# Patient Record
Sex: FEMALE | Race: WHITE | NOT HISPANIC OR LATINO | Employment: STUDENT | URBAN - METROPOLITAN AREA
[De-identification: names, ages, dates, MRNs, and addresses within clinical notes are randomized per-mention and may not be internally consistent; named-entity substitution may affect disease eponyms.]

---
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
## 2022-01-03 RX ORDER — DROSPIRENONE AND ETHINYL ESTRADIOL 0.03MG-3MG
1 KIT ORAL DAILY
Qty: 84 TABLET | Refills: 0 | Status: SHIPPED | OUTPATIENT
Start: 2022-01-03 | End: 2022-01-14 | Stop reason: SDUPTHER

## 2022-01-14 ENCOUNTER — ANNUAL EXAM (OUTPATIENT)
Dept: OBGYN CLINIC | Facility: CLINIC | Age: 22
End: 2022-01-14
Payer: COMMERCIAL

## 2022-01-14 VITALS
SYSTOLIC BLOOD PRESSURE: 118 MMHG | BODY MASS INDEX: 24.4 KG/M2 | HEIGHT: 68 IN | DIASTOLIC BLOOD PRESSURE: 70 MMHG | WEIGHT: 161 LBS

## 2022-01-14 DIAGNOSIS — N93.9 ABNORMAL UTERINE BLEEDING (AUB): ICD-10-CM

## 2022-01-14 DIAGNOSIS — Z01.419 ENCOUNTER FOR WELL WOMAN EXAM WITH ROUTINE GYNECOLOGICAL EXAM: Primary | ICD-10-CM

## 2022-01-14 PROCEDURE — 99395 PREV VISIT EST AGE 18-39: CPT | Performed by: OBSTETRICS & GYNECOLOGY

## 2022-01-14 RX ORDER — DROSPIRENONE AND ETHINYL ESTRADIOL 0.03MG-3MG
1 KIT ORAL DAILY
Qty: 84 TABLET | Refills: 3 | Status: SHIPPED | OUTPATIENT
Start: 2022-01-14 | End: 2022-02-01 | Stop reason: SDUPTHER

## 2022-01-14 NOTE — PROGRESS NOTES
ASSESSMENT & PLAN: Chris Garcia is a 25 y o  Christdemian Jean with normal gynecologic exam     1   Routine well woman exam done today  2   Pap smears will start at age 26 per the ASCCP guidelines  3   Gardasil is recommended for patients from 526 years of age  The patient has not had the Gardasil vaccine series  4  The patient is not sexually active  She accepted contraception and options have been discussed  5  The following were reviewed in today's visit: use and side effects of OCPs  6  Patient to return to office in 12 months for Yearly  All questions have been answered to her satisfaction  CC:  Annual Gynecologic Examination    HPI: Chris Garcia is a 25 y o  Christdemian Jean who presents for annual gynecologic examination  She has the following concerns:  Doing well on the Desire  Would like to continue  Has not had irregular bleeding  Health Maintenance:    She exercises 7 days per week  She wears her seatbelt routinely  She does not perform regular monthly self breast exams  She feels safe at home  Patients does follow a healhy diet  No past medical history on file  Past Surgical History:   Procedure Laterality Date    FL INJECTION LEFT SHOULDER (ARTHROGRAM)  2021       Past OB/Gyn History:   Patient's last menstrual period was 2022  Menstrual History:  OB History        0    Para   0    Term   0       0    AB   0    Living   0       SAB   0    IAB   0    Ectopic   0    Multiple   0    Live Births   0              Patient's last menstrual period was 2022  History of sexually transmitted infection No  Patient is not currently sexually active        Family History   Problem Relation Age of Onset    Hypertension Mother     Migraines Father     Colon cancer Maternal Grandmother        Social History:  Social History     Socioeconomic History    Marital status: Single     Spouse name: Not on file    Number of children: Not on file    Years of education: Not on file    Highest education level: Not on file   Occupational History    Not on file   Tobacco Use    Smoking status: Never Smoker    Smokeless tobacco: Never Used   Vaping Use    Vaping Use: Never used   Substance and Sexual Activity    Alcohol use: No    Drug use: No    Sexual activity: Never   Other Topics Concern    Not on file   Social History Narrative    Not on file     Social Determinants of Health     Financial Resource Strain: Not on file   Food Insecurity: Not on file   Transportation Needs: Not on file   Physical Activity: Not on file   Stress: Not on file   Social Connections: Not on file   Intimate Partner Violence: Not on file   Housing Stability: Not on file       Allergies   Allergen Reactions    Pertussis Vaccines      Had seizure hx   Told to not receive vaccine       Current Outpatient Medications:     drospirenone-ethinyl estradiol (DUNG) 3-0 03 MG per tablet, Take 1 tablet by mouth daily, Disp: 84 tablet, Rfl: 3    Review of Systems:  Review of Systems    Physical Exam:  /70 (BP Location: Left arm, Patient Position: Sitting, Cuff Size: Adult)   Ht 5' 8" (1 727 m)   Wt 73 kg (161 lb)   LMP 01/08/2022   BMI 24 48 kg/m²    OBGyn Exam  /70 (BP Location: Left arm, Patient Position: Sitting, Cuff Size: Adult)   Ht 5' 8" (1 727 m)   Wt 73 kg (161 lb)   LMP 01/08/2022   BMI 24 48 kg/m²   General appearance: alert and oriented, in no acute distress  Head: Normocephalic, without obvious abnormality, atraumatic  Lungs: clear to auscultation bilaterally  Heart: regular rate and rhythm, S1, S2 normal, no murmur, click, rub or gallop  Abdomen: soft, non-tender; bowel sounds normal; no masses,  no organomegaly

## 2022-02-01 DIAGNOSIS — N93.9 ABNORMAL UTERINE BLEEDING (AUB): ICD-10-CM

## 2022-02-01 RX ORDER — DROSPIRENONE AND ETHINYL ESTRADIOL 0.03MG-3MG
1 KIT ORAL DAILY
Qty: 84 TABLET | Refills: 3 | Status: CANCELLED | OUTPATIENT
Start: 2022-02-01

## 2022-02-01 NOTE — TELEPHONE ENCOUNTER
----- Message from Tarsha Zarate sent at 1/28/2022 10:18 AM EST -----  Regarding: Birth Cloyce [facility] -- when I was in earlier this month, I gave you an old mail order pharmacy for my birth control pills  [facility] contacted me to let me know they cannot fill it since I am no longer a member there  Can you send the refills to [facility]? Our ID is [id number]  I will make sure it is updated on my [place] [facility] chart  My apologies for the confusion, we have had OUYA since last year, I must have just forgotten  Thanks so much

## 2022-07-07 ENCOUNTER — OFFICE VISIT (OUTPATIENT)
Dept: URGENT CARE | Age: 22
End: 2022-07-07
Payer: COMMERCIAL

## 2022-07-07 VITALS
HEART RATE: 88 BPM | RESPIRATION RATE: 20 BRPM | SYSTOLIC BLOOD PRESSURE: 110 MMHG | BODY MASS INDEX: 26.04 KG/M2 | WEIGHT: 171.8 LBS | HEIGHT: 68 IN | TEMPERATURE: 97.5 F | DIASTOLIC BLOOD PRESSURE: 78 MMHG | OXYGEN SATURATION: 97 %

## 2022-07-07 DIAGNOSIS — R05.9 COUGH: ICD-10-CM

## 2022-07-07 DIAGNOSIS — Z11.59 SPECIAL SCREENING EXAMINATION FOR VIRAL DISEASE: Primary | ICD-10-CM

## 2022-07-07 LAB
SARS-COV-2 AG UPPER RESP QL IA: NEGATIVE
VALID CONTROL: NORMAL

## 2022-07-07 PROCEDURE — 87811 SARS-COV-2 COVID19 W/OPTIC: CPT | Performed by: NURSE PRACTITIONER

## 2022-07-07 PROCEDURE — G0382 LEV 3 HOSP TYPE B ED VISIT: HCPCS | Performed by: NURSE PRACTITIONER

## 2022-07-07 NOTE — LETTER
July 7, 2022     Patient: Brijesh Frye  YOB: 2000  Date of Visit: 7/7/2022      To Whom it May Concern:    Brijesh Frye is under my professional care  Michoacano Me was seen in my office on 7/7/2022  Michoacano Me may return to work on 07/08/2022 as long as fever free            Sincerely,          CONCHITA Esqueda        CC: No Recipients

## 2022-07-07 NOTE — PROGRESS NOTES
NAME: Bret Hackett is a 25 y o  female  : 2000    MRN: [id number]    /78   Pulse 88   Temp 97 5 °F (36 4 °C)   Resp 20   Ht 5' 8" (1 727 m)   Wt 77 9 kg (171 lb 12 8 oz)   LMP 2022   SpO2 97%   BMI 26 12 kg/m²     Assessment and Plan   Special screening examination for viral disease [Z11 59]  1  Special screening examination for viral disease  Poct Covid 19 Rapid Antigen Test   2  Cough         Lise was seen today for cold like symptoms  Diagnoses and all orders for this visit:    Special screening examination for viral disease  -     Poct Covid 19 Rapid Antigen Test    Cough    rapid covid test negative    Patient Instructions   Patient Instructions   Take zyrtec, allegra, or Claritin daily  Use flonase 1-2 sprays in each nare daily   Use nasal saline to the nose,   Use humidifer in room  Symptoms worsen go to ER  Rest    Covid test today was negative        Proceed to the nearest ER if symptoms worsen, Follow up with your PCP  Continue to social distance, wash your hands, and wear your masks  Please continue to follow the CDC  gov guidelines daily for they are subject to change on COVID-19    Chief Complaint     Chief Complaint   Patient presents with    Cold Like Symptoms     Pt reports three day hx of nasal congestion, body aches, headache and cough  Temp of 99 several days ago  Performed Rapid Covid test  (neg)  Pt is Covid vaccinated  Taking Advil and Sudafed  History of Present Illness     Patient is a 81-year-old female who is here today with 3 days of symptoms  She complains of having nasal congestion body aches headache and a cough and had a low-grade temp  She did a home COVID test Tuesday evening and was negative  She is COVID-19 vaccinated has taking Advil and Sudafed and is here today as well as she called off work and needs a work note  Review of Systems   Review of Systems   Constitutional: Negative for chills, fatigue and fever  HENT: Positive for postnasal drip, sneezing and sore throat  Negative for congestion, ear pain, rhinorrhea, sinus pressure and sinus pain  Eyes: Negative  Respiratory: Positive for cough  Negative for shortness of breath  Cardiovascular: Negative  Gastrointestinal: Negative  Genitourinary: Negative  Musculoskeletal: Negative  Skin: Negative  Psychiatric/Behavioral: Negative  Current Medications       Current Outpatient Medications:     drospirenone-ethinyl estradiol (DUNG) 3-0 03 MG per tablet, Take 1 tablet by mouth daily, Disp: 84 tablet, Rfl: 3    Current Allergies     Allergies as of 07/07/2022 - Reviewed 07/07/2022   Allergen Reaction Noted    Pertussis vaccines  11/11/2015              Past Medical History:   Diagnosis Date    Seizures ([address] )        Past Surgical History:   Procedure Laterality Date    FL INJECTION LEFT SHOULDER (ARTHROGRAM)  2/24/2021       Family History   Problem Relation Age of Onset    Hypertension Mother     Migraines Father     Colon cancer Maternal Grandmother          Medications have been verified  The following portions of the patient's history were reviewed and updated as appropriate: allergies, current medications, past family history, past medical history, past social history, past surgical history and problem list     Objective   /78   Pulse 88   Temp 97 5 °F (36 4 °C)   Resp 20   Ht 5' 8" (1 727 m)   Wt 77 9 kg (171 lb 12 8 oz)   LMP 06/22/2022   SpO2 97%   BMI 26 12 kg/m²      Physical Exam     Physical Exam  Constitutional:       General: She is not in acute distress  Appearance: She is well-developed  She is not ill-appearing  HENT:      Head: Normocephalic  Right Ear: Hearing, tympanic membrane, ear canal and external ear normal       Left Ear: Hearing, tympanic membrane, ear canal and external ear normal       Nose: No mucosal edema  Mouth/Throat:      Lips: Pink        Mouth: Mucous membranes are moist       Pharynx: Oropharynx is clear  Uvula midline  No posterior oropharyngeal erythema  Tonsils: No tonsillar exudate  0 on the right  0 on the left  Eyes:      Conjunctiva/sclera: Conjunctivae normal       Pupils: Pupils are equal, round, and reactive to light  Neck:      Thyroid: No thyroid mass  Cardiovascular:      Rate and Rhythm: Normal rate and regular rhythm  Heart sounds: Normal heart sounds  Pulmonary:      Effort: Pulmonary effort is normal       Breath sounds: Normal breath sounds  Musculoskeletal:      Cervical back: Normal range of motion  No erythema  Neurological:      Mental Status: She is alert  Psychiatric:         Behavior: Behavior is cooperative  Note: Portions of this record may have been created with voice recognition software  Occasional wrong word or "sound a like" substitutions may have occurred due to the inherent limitations of voice recognition software  Please read the chart carefully and recognize, using context, where substitutions have occurred  CONCHITA Ashley

## 2022-07-07 NOTE — PATIENT INSTRUCTIONS
Take zyrtec, allegra, or Claritin daily  Use flonase 1-2 sprays in each nare daily   Use nasal saline to the nose,   Use humidifer in room  Symptoms worsen go to ER  Rest    Covid test today was negative

## 2022-12-16 DIAGNOSIS — N93.9 ABNORMAL UTERINE BLEEDING (AUB): ICD-10-CM

## 2022-12-18 RX ORDER — DROSPIRENONE AND ETHINYL ESTRADIOL 0.03MG-3MG
KIT ORAL
Qty: 84 TABLET | Refills: 3 | Status: SHIPPED | OUTPATIENT
Start: 2022-12-18

## 2022-12-27 ENCOUNTER — TELEPHONE (OUTPATIENT)
Dept: DERMATOLOGY | Facility: CLINIC | Age: 22
End: 2022-12-27

## 2022-12-27 NOTE — TELEPHONE ENCOUNTER
NP calling for apt for rash, informed of timeframe for apts, asked what to do informed to contact pcp for issue as they'll be seen sooner rather than later  Pts mother verbally agreed  Scheduled apt in May but pt didn't want apt that far out, so apt cancelled, wcb if wants to schedule

## 2023-01-16 ENCOUNTER — ANNUAL EXAM (OUTPATIENT)
Dept: OBGYN CLINIC | Facility: MEDICAL CENTER | Age: 23
End: 2023-01-16

## 2023-01-16 VITALS — BODY MASS INDEX: 25.7 KG/M2 | HEIGHT: 68 IN | WEIGHT: 169.6 LBS

## 2023-01-16 DIAGNOSIS — N93.9 ABNORMAL UTERINE BLEEDING (AUB): ICD-10-CM

## 2023-01-16 RX ORDER — CLOTRIMAZOLE AND BETAMETHASONE DIPROPIONATE 10; .64 MG/G; MG/G
1 CREAM TOPICAL 2 TIMES DAILY
COMMUNITY
Start: 2023-01-03

## 2023-01-16 RX ORDER — DROSPIRENONE AND ETHINYL ESTRADIOL 0.03MG-3MG
1 KIT ORAL DAILY
Qty: 84 TABLET | Refills: 3 | Status: SHIPPED | OUTPATIENT
Start: 2023-01-16

## 2023-01-16 RX ORDER — CLOTRIMAZOLE AND BETAMETHASONE DIPROPIONATE 10; .64 MG/G; MG/G
CREAM TOPICAL 2 TIMES DAILY
COMMUNITY
Start: 2023-01-03 | End: 2024-01-03

## 2023-01-16 NOTE — PROGRESS NOTES
1   Yearly    Pap to start next year at 26years old     2     Birth control refill    Currently on     Desire   She reports -   Pt has been on since - 11/20- tried a few others bc of irregular bleeding     Cycles - regular    Pain - none   PMS - much better   wants to stay on it         22 y/o  For birth control and annual exam     Past medical / social / surgical / family history reviewed and updated   Medication and allergies discussed in detail and updated     Review of Systems - History obtained from chart review and the patient  General ROS: negative  Psychological ROS: negative  Ophthalmic ROS: negative  ENT ROS: negative  Allergy and Immunology ROS: negative  Hematological and Lymphatic ROS: negative  Endocrine ROS: negative  Breast ROS: negative for breast lumps  Respiratory ROS: no cough, shortness of breath, or wheezing  Cardiovascular ROS: no chest pain or dyspnea on exertion  Gastrointestinal ROS: no abdominal pain, change in bowel habits, or black or bloody stools  Genito-Urinary ROS: no dysuria, trouble voiding, or hematuria  Musculoskeletal ROS: negative  Neurological ROS: no TIA or stroke symptoms  Dermatological ROS: negative

## 2023-03-10 ENCOUNTER — TELEPHONE (OUTPATIENT)
Dept: OBGYN CLINIC | Facility: MEDICAL CENTER | Age: 23
End: 2023-03-10

## 2023-03-10 NOTE — TELEPHONE ENCOUNTER
Called and spoke with patient; told her there are refills on her birthcontrol available through the [facility] mail service and advised patient to call

## 2023-03-10 NOTE — TELEPHONE ENCOUNTER
Patient's mother called about the Desire  Patient is close to running out of her birth control and would like to know if she would be able to get [facility] [employer] [facility]  Patient would like then the other 2 refills sent over to the [address]  Please review when you get a chance  Thank you

## 2023-04-24 NOTE — TELEPHONE ENCOUNTER
----- Message from Lexis Valentine MD sent at 7/16/2018  3:19 PM EDT -----  Yes, change to lo estrin/taytulla    ----- Message -----  From: Jess Iglesias RN  Sent: 7/16/2018   2:24 PM  To: Lexis Valentine MD    Lise's mother called  Jennifer Harris is experiencing heavy break thru bleeding every 2 weeks on the lo-lo  Can we try something else? ?
Break-thru bleeding with prev  Pill  Rx   For taytulla pended
Attending Attestation (For Attendings USE Only)...

## 2023-12-20 DIAGNOSIS — N93.9 ABNORMAL UTERINE BLEEDING (AUB): ICD-10-CM

## 2023-12-26 RX ORDER — DROSPIRENONE AND ETHINYL ESTRADIOL 0.03MG-3MG
1 KIT ORAL DAILY
Qty: 84 TABLET | Refills: 3 | Status: SHIPPED | OUTPATIENT
Start: 2023-12-26

## 2024-01-19 ENCOUNTER — TELEMEDICINE (OUTPATIENT)
Dept: OBGYN CLINIC | Facility: MEDICAL CENTER | Age: 24
End: 2024-01-19
Payer: COMMERCIAL

## 2024-01-19 DIAGNOSIS — R79.89 ELEVATED TSH: Primary | ICD-10-CM

## 2024-01-19 PROCEDURE — 99213 OFFICE O/P EST LOW 20 MIN: CPT | Performed by: OBSTETRICS & GYNECOLOGY

## 2024-01-19 NOTE — PROGRESS NOTES
Refil of BC     Pt doing well on her birth control   Wants to cont    She has a very high TSH  Recommend to see endocrine Virtual Regular Visit    Verification of patient location:    Patient is located at Home in the following state in which I hold an active license [place]      Assessment/Plan:    Problem List Items Addressed This Visit    None  Visit Diagnoses       Elevated TSH    -  Primary    Relevant Orders    Ambulatory Referral to Endocrinology                 Reason for visit is   Chief Complaint   Patient presents with    Follow-up     Birth control        Encounter provider Chucky Juares MD    Provider located at [facility]  [facility]  [address]  [address]  [place] [address]      Recent Visits  No visits were found meeting these conditions.  Showing recent visits within past 7 days and meeting all other requirements  Today's Visits  Date Type Provider Dept   01/19/24 Telemedicine Chucky Juares MD  [facility]   Showing today's visits and meeting all other requirements  Future Appointments  No visits were found meeting these conditions.  Showing future appointments within next 150 days and meeting all other requirements       The patient was identified by name and date of birth. Lise Levine was informed that this is a telemedicine visit and that the visit is being conducted through the Epic Embedded platform. She agrees to proceed..  My office door was closed. No one else was in the room.  She acknowledged consent and understanding of privacy and security of the video platform. The patient has agreed to participate and understands they can discontinue the visit at any time.    Patient is aware this is a billable service.     Subjective  Lise Levine is a 23 y.o. female as above  .      HPI     Past Medical History:   Diagnosis Date    Seizures ([facility]CC)        Past Surgical History:   Procedure Laterality Date    FL INJECTION  LEFT SHOULDER (ARTHROGRAM)  2/24/2021       Current Outpatient Medications   Medication Sig Dispense Refill    drospirenone-ethinyl estradiol (DUNG) 3-0.03 MG per tablet TAKE 1 TABLET DAILY 84 tablet 3    clotrimazole-betamethasone (LOTRISONE) 1-0.05 % cream Apply 1 application topically 2 (two) times a day To affected area (Patient not taking: Reported on 1/19/2024)      clotrimazole-betamethasone (LOTRISONE) 1-0.05 % cream Apply topically 2 (two) times a day       No current facility-administered medications for this visit.        Allergies   Allergen Reactions    Pertussis Vaccines      Had seizure hx. Told to not receive vaccine       Review of Systems    Video Exam    There were no vitals filed for this visit.    Physical Exam     Visit Time  Total Visit Duration: 20

## 2024-03-03 ENCOUNTER — OFFICE VISIT (OUTPATIENT)
Dept: URGENT CARE | Age: 24
End: 2024-03-03
Payer: COMMERCIAL

## 2024-03-03 VITALS
HEIGHT: 68 IN | BODY MASS INDEX: 26.07 KG/M2 | WEIGHT: 172 LBS | RESPIRATION RATE: 18 BRPM | TEMPERATURE: 97.9 F | HEART RATE: 96 BPM | OXYGEN SATURATION: 98 %

## 2024-03-03 DIAGNOSIS — J02.9 ACUTE PHARYNGITIS, UNSPECIFIED ETIOLOGY: Primary | ICD-10-CM

## 2024-03-03 PROCEDURE — 99213 OFFICE O/P EST LOW 20 MIN: CPT | Performed by: EMERGENCY MEDICINE

## 2024-03-03 RX ORDER — AMOXICILLIN 500 MG/1
500 CAPSULE ORAL EVERY 8 HOURS SCHEDULED
Qty: 30 CAPSULE | Refills: 0 | Status: SHIPPED | OUTPATIENT
Start: 2024-03-03 | End: 2024-03-03 | Stop reason: CLARIF

## 2024-03-03 RX ORDER — AMOXICILLIN 500 MG/1
500 CAPSULE ORAL EVERY 8 HOURS SCHEDULED
Qty: 30 CAPSULE | Refills: 0 | Status: SHIPPED | OUTPATIENT
Start: 2024-03-03 | End: 2024-03-14 | Stop reason: ALTCHOICE

## 2024-03-14 ENCOUNTER — CONSULT (OUTPATIENT)
Dept: ENDOCRINOLOGY | Facility: CLINIC | Age: 24
End: 2024-03-14
Payer: COMMERCIAL

## 2024-03-14 VITALS
WEIGHT: 158.8 LBS | OXYGEN SATURATION: 98 % | SYSTOLIC BLOOD PRESSURE: 120 MMHG | DIASTOLIC BLOOD PRESSURE: 70 MMHG | BODY MASS INDEX: 24.07 KG/M2 | HEIGHT: 68 IN | HEART RATE: 68 BPM

## 2024-03-14 DIAGNOSIS — R79.89 ELEVATED TSH: ICD-10-CM

## 2024-03-14 PROCEDURE — 99204 OFFICE O/P NEW MOD 45 MIN: CPT | Performed by: INTERNAL MEDICINE

## 2024-03-14 NOTE — PROGRESS NOTES
"Chief Complaint   Patient presents with    abnormal TSH      Referring Provider  Chucky Juares Md  [address] [place] [address]  [address]  [place] [address]     History of Present Illness:   Lise Levine is a 23 y.o. female with hypothyroidism seen in consultation at the request of Dr. Juares  Does not recall a viral illness    Had strep last week after being exposed and completed her amoxicillin    She notes dry patches on skin, fatigued, mood had decreased for 2-3mos straight. She attributed most to burnout from college. Her schedule is better this semester, and she is feeling better. 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 Swimming is a major activity for her entire life and this skin change I new. Appetite has been poor since Nov 2023. There has been a mental \"fogginess\" as well, but she is still able to do her school work.     Also, she had an unintentional weight loss, but began cooking for herself and eating a more healthy diet. Menses are unchanged for her.     Jan 2024 the TSH was 10.30 and IN feb 2024 improved to 5.44. On review of several prior TSH levels, there have been mild elevations that improve spontaneously. She has never taking any medications for her thyroid.    Does not recall any illnesses in the last fall or early winter.   Denies changes in her neck or voice.     Fhx:   thyroid disorder- maternal aunt and maternal cousin with thyroid cancer.     Patient Active Problem List   Diagnosis    Chronic left shoulder pain    Instability of left shoulder joint      Past Medical History:   Diagnosis Date    Seizures (HCC)       Past Surgical History:   Procedure Laterality Date    FL INJECTION LEFT SHOULDER (ARTHROGRAM)  2/24/2021      Family History   Problem Relation Age of Onset    Hypertension Mother     Migraines Father     Colon cancer Maternal Grandmother     Diabetes Paternal Grandfather      Social History " "    Tobacco Use    Smoking status: Never    Smokeless tobacco: Never   Substance Use Topics    Alcohol use: No     Allergies   Allergen Reactions    Pertussis Vaccines      Had seizure hx. Told to not receive vaccine         Current Outpatient Medications:     drospirenone-ethinyl estradiol (DUNG) 3-0.03 MG per tablet, TAKE 1 TABLET DAILY, Disp: 84 tablet, Rfl: 3  Review of Systems   Constitutional:  Positive for fatigue and unexpected weight change.   HENT:  Negative for trouble swallowing and voice change.    Eyes:  Negative for visual disturbance.   Cardiovascular:  Negative for palpitations.   Gastrointestinal:  Negative for constipation and diarrhea.   Genitourinary:  Positive for menstrual problem.   Skin:         See HPI   Neurological:  Negative for tremors and weakness.   Psychiatric/Behavioral:  Positive for decreased concentration. Negative for dysphoric mood. The patient is not nervous/anxious.    All other systems reviewed and are negative.      Physical Exam:  Body mass index is 24.15 kg/m².  /70   Pulse 68   Ht 5' 8\" (1.727 m)   Wt 72 kg (158 lb 12.8 oz)   SpO2 98%   BMI 24.15 kg/m²    Wt Readings from Last 3 Encounters:   03/14/24 72 kg (158 lb 12.8 oz)   03/03/24 78 kg (172 lb)   01/16/23 76.9 kg (169 lb 9.6 oz)       GEN: NAD  E/n/m nl facies, hearing intact bilat, tongue midline, lips nl  Eyes: no stare or proptosis, nl lids, EOMI  Neck: trachea midline, thyroid NT to palpation, nl in size, no nodules or neck masses noted, no cervical LAD  CV; heart reg rate s1s2 nl, no m/r/g appreciated  Resp: CTAB, good effort  Ab+BS  Neuro: no tremor  MS: no c/c in digits, moves all 4 ext, nl muscle bulk, gait nl  Skin: warm and dry, no palmar erythema    Psych: nl mood and affect, no gross lapses in memory    DATA:  Labs:       Lab Results   Component Value Date    TSH 5.44 (H) 02/22/2024         Radiology    Impression:  1. Elevated TSH           Plan:    Lise was seen today for abnormal " tsh.    Diagnoses and all orders for this visit:    Elevated TSH  -     Ambulatory Referral to Endocrinology  -     TSH, 3rd generation; Future  -     T4, free; Future  -     Thyroid Antibodies Panel; Future         Abnormal TSH: She does not recall any illnesses and is feeling better as her TSH has improved. Discussed common causes of this including Hashimoto's thyroiditis. She would prefer to remain off medication if possible and is not planning a pregnancy. Will check TSH, Free T4, and thyroid antibodies in 6 weeks to separate this from her recent step infection.     Plan RTC in 6mos  Discussed with the patient and all questioned fully answered. She will call me if any problems arise.        Madalyn Bolton MD

## 2024-04-18 LAB
SL AMB THYROGLOBULIN AUTOAB: <1 IU/ML
T4 FREE SERPL-MCNC: 1.06 NG/DL (ref 0.61–1.12)
THYROPEROXIDASE AB SERPL-ACNC: <1 IU/ML
TSH SERPL-ACNC: 5.49 UIU/ML (ref 0.45–5.33)

## 2024-04-19 DIAGNOSIS — R79.89 ELEVATED TSH: Primary | ICD-10-CM

## 2024-09-16 LAB
T4 FREE SERPL-MCNC: 0.96 NG/DL (ref 0.61–1.12)
TSH SERPL-ACNC: 6.99 UIU/ML (ref 0.45–5.33)

## 2024-09-20 ENCOUNTER — OFFICE VISIT (OUTPATIENT)
Dept: ENDOCRINOLOGY | Facility: CLINIC | Age: 24
End: 2024-09-20
Payer: COMMERCIAL

## 2024-09-20 VITALS
BODY MASS INDEX: 24.63 KG/M2 | SYSTOLIC BLOOD PRESSURE: 118 MMHG | WEIGHT: 162 LBS | DIASTOLIC BLOOD PRESSURE: 60 MMHG | OXYGEN SATURATION: 97 % | HEART RATE: 80 BPM

## 2024-09-20 DIAGNOSIS — R53.83 OTHER FATIGUE: ICD-10-CM

## 2024-09-20 DIAGNOSIS — E03.9 HYPOTHYROIDISM, UNSPECIFIED TYPE: Primary | ICD-10-CM

## 2024-09-20 PROCEDURE — 99214 OFFICE O/P EST MOD 30 MIN: CPT

## 2024-09-20 RX ORDER — LEVOTHYROXINE SODIUM 25 UG/1
25 TABLET ORAL DAILY
Qty: 30 TABLET | Refills: 2 | Status: SHIPPED | OUTPATIENT
Start: 2024-09-20

## 2024-09-20 NOTE — PROGRESS NOTES
Established Patient Progress Note    CC: Follow up for hypothyroidism    Impression & Plan:    Problem List Items Addressed This Visit       Hypothyroidism - Primary     TSH 6.99, T4 0.96  Patient reports symptoms of fatigue, brain fog, cold intolerance.  Discussed options for medication.  She is agreeable  Start levothyroxine 25 mcg daily and repeat lab work in 6 weeks  Discussed to separate levothyroxine from iron and multivitamin by 4 hours and 30 to 60 minutes before food or drink  Patient to notify with any issues in the interim  We will also check vitamin D level to rule out other cause for fatigue. She also has a history of iron deficiency for which she should follow up with her PCP. Iron levels have not been checked since 2021. She recently restarted supplement  Discussed need for closer follow-up in the event of pregnancy  Also discussed need to optimize thyroid levels before pregnancy         Relevant Medications    levothyroxine (Euthyrox) 25 mcg tablet    Other Relevant Orders    TSH + Free T4     Other Visit Diagnoses       Other fatigue        Relevant Orders    Vitamin D 25 hydroxy            Orders Placed This Encounter   Procedures    Vitamin D 25 hydroxy     Standing Status:   Future     Standing Expiration Date:   9/20/2025    TSH + Free T4     Standing Status:   Future     Standing Expiration Date:   9/20/2025       History of Present Illness:   Lise Levine is a 23 y.o. female with history of elevated TSH found on lab work.  She was initially seen by endocrinology in March 2024 by Dr. Bolton.  She did have a strep infection around this time that TSH was elevated.  Repeat TSH after infection resolved to remain elevated.  Antibody testing for Hashimoto's disease was negative. Patient presents with symptoms of fatigue, cold intolerance and brain fog.     Patient reports she has a history of iron deficiency which she recently restarted an iron supplement to help with her symptoms of fatigue and  brain fog.  She also takes a multivitamin.      Patient Active Problem List   Diagnosis    Chronic left shoulder pain    Instability of left shoulder joint    Hypothyroidism      Past Medical History:   Diagnosis Date    Seizures (HCC)       Past Surgical History:   Procedure Laterality Date    FL INJECTION LEFT SHOULDER (ARTHROGRAM)  2/24/2021      Family History   Problem Relation Age of Onset    Hypertension Mother     Migraines Father     Colon cancer Maternal Grandmother     Diabetes Paternal Grandfather      Social History     Tobacco Use    Smoking status: Never    Smokeless tobacco: Never   Substance Use Topics    Alcohol use: No     Allergies   Allergen Reactions    Pertussis Vaccines      Had seizure hx. Told to not receive vaccine         Current Outpatient Medications:     levothyroxine (Euthyrox) 25 mcg tablet, Take 1 tablet (25 mcg total) by mouth daily, Disp: 30 tablet, Rfl: 2    drospirenone-ethinyl estradiol (DUNG) 3-0.03 MG per tablet, TAKE 1 TABLET DAILY, Disp: 84 tablet, Rfl: 3    Review of Systems   Constitutional:  Negative for chills and fever.   HENT:  Negative for ear pain and sore throat.    Eyes:  Negative for pain and visual disturbance.   Respiratory:  Negative for cough and shortness of breath.    Cardiovascular:  Negative for chest pain and palpitations.   Gastrointestinal:  Negative for abdominal pain and vomiting.   Genitourinary:  Negative for dysuria and hematuria.   Musculoskeletal:  Negative for arthralgias and back pain.   Skin:  Negative for color change and rash.   Neurological:  Negative for seizures and syncope.   All other systems reviewed and are negative.      Physical Exam:  Body mass index is 24.63 kg/m².  /60   Pulse 80   Wt 73.5 kg (162 lb)   SpO2 97%   BMI 24.63 kg/m²    Wt Readings from Last 3 Encounters:   09/20/24 73.5 kg (162 lb)   03/14/24 72 kg (158 lb 12.8 oz)   03/03/24 78 kg (172 lb)       Physical Exam  Vitals reviewed.   Constitutional:        "Appearance: Normal appearance.   HENT:      Head: Normocephalic and atraumatic.   Cardiovascular:      Rate and Rhythm: Normal rate.   Pulmonary:      Effort: Pulmonary effort is normal.   Musculoskeletal:      Cervical back: Neck supple. No tenderness.   Lymphadenopathy:      Cervical: No cervical adenopathy.   Neurological:      Mental Status: She is alert and oriented to person, place, and time.   Psychiatric:         Mood and Affect: Mood normal.         Behavior: Behavior normal.         Labs:   No results found for: \"HGBA1C\"  Lab Results   Component Value Date    CREATININE 0.78 01/11/2024    CREATININE 0.85 02/14/2020    BUN 13 01/11/2024    K 4.3 01/11/2024     01/11/2024    CO2 26 01/11/2024     GFR, Calculated   Date Value Ref Range Status   02/14/2020  mL/min/1.73m2 Final    Not performed on patients less than 18 years of age or greater than 97 years of age     eGFRcr   Date Value Ref Range Status   01/11/2024 111 >59 Final     No results found for: \"CHOL\", \"HDL\", \"LDL\", \"TRIG\", \"CHOLHDL\"  Lab Results   Component Value Date    ALT 17 01/11/2024    AST 16 01/11/2024    ALKPHOS 56 01/11/2024     No results found for: \"[id number]\"  Lab Results   Component Value Date    FREET4 0.96 09/16/2024         There are no Patient Instructions on file for this visit.      Discussed with the patient and all questioned fully answered. She will call me if any problems arise.    "

## 2024-09-20 NOTE — ASSESSMENT & PLAN NOTE
TSH 6.99, T4 0.96  Patient reports symptoms of fatigue, brain fog, cold intolerance.  Discussed options for medication.  She is agreeable  Start levothyroxine 25 mcg daily and repeat lab work in 6 weeks  Discussed to separate levothyroxine from iron and multivitamin by 4 hours and 30 to 60 minutes before food or drink  Patient to notify with any issues in the interim  We will also check vitamin D level to rule out other cause for fatigue. She also has a history of iron deficiency for which she should follow up with her PCP. Iron levels have not been checked since 2021. She recently restarted supplement  Discussed need for closer follow-up in the event of pregnancy  Also discussed need to optimize thyroid levels before pregnancy

## 2024-10-12 DIAGNOSIS — E03.9 HYPOTHYROIDISM, UNSPECIFIED TYPE: ICD-10-CM

## 2024-10-14 RX ORDER — LEVOTHYROXINE SODIUM 25 UG/1
25 TABLET ORAL DAILY
Qty: 90 TABLET | Refills: 1 | Status: SHIPPED | OUTPATIENT
Start: 2024-10-14

## 2024-11-08 LAB
T4 FREE SERPL-MCNC: 1.08 NG/DL (ref 0.61–1.12)
TSH SERPL-ACNC: 4.54 UIU/ML (ref 0.45–5.33)

## 2024-11-14 ENCOUNTER — RESULTS FOLLOW-UP (OUTPATIENT)
Dept: ENDOCRINOLOGY | Facility: CLINIC | Age: 24
End: 2024-11-14

## 2025-04-11 DIAGNOSIS — E03.9 HYPOTHYROIDISM, UNSPECIFIED TYPE: ICD-10-CM

## 2025-04-11 RX ORDER — LEVOTHYROXINE SODIUM 25 UG/1
25 TABLET ORAL DAILY
Qty: 90 TABLET | Refills: 1 | Status: SHIPPED | OUTPATIENT
Start: 2025-04-11